# Patient Record
Sex: FEMALE | Race: WHITE
[De-identification: names, ages, dates, MRNs, and addresses within clinical notes are randomized per-mention and may not be internally consistent; named-entity substitution may affect disease eponyms.]

---

## 2018-10-16 ENCOUNTER — HOSPITAL ENCOUNTER (EMERGENCY)
Dept: HOSPITAL 80 - FED | Age: 28
Discharge: HOME | End: 2018-10-16
Payer: MEDICAID

## 2018-10-16 VITALS — SYSTOLIC BLOOD PRESSURE: 123 MMHG | DIASTOLIC BLOOD PRESSURE: 76 MMHG

## 2018-10-16 DIAGNOSIS — Z90.49: ICD-10-CM

## 2018-10-16 DIAGNOSIS — E86.9: ICD-10-CM

## 2018-10-16 DIAGNOSIS — G43.809: Primary | ICD-10-CM

## 2018-10-16 LAB — PLATELET # BLD: 245 10^3/UL (ref 150–400)

## 2018-10-16 NOTE — EDPHY
H & P


Stated Complaint: migraine headache since 4 p.m. yeaterday, increased pain @ 1 

am


Time Seen by Provider: 10/16/18 04:26


HPI/ROS: 





HPI





CHIEF COMPLAINT:  "I am having a migraine"





HISTORY OF PRESENT ILLNESS:  28-year-old female, suffers from migraine headaches

, presents emergency room stating that she is having a migraine.  She typically 

would take migraine medication however she does not have a primary care doctor 

and does not have any current migraine medications.  She states her headache is 

bifrontal and is very similar to her previous migraine headaches.  Describes a 

throbbing sensation.  She also gets ocular migraines.  She denies any ocular 

complaints at this time.  Denies chest pain shortness of breath.  Denies fever.

  Denies neck stiffness.  Headaches been going on since 4:00 p.m. Yesterday.  

Gradual onset.  Not thunderclap.  Not sudden onset.





Past Medical History:  Migraine headache





Past Surgical History:  Gallbladder removal





Social History:  Denies daily use drugs alcohol tobacco.





Family History:  Noncontributory








ROS   


REVIEW OF SYSTEMS:


10 Systems were reviewed and negative with the exception of the elements 

mentioned in the history of present illness.








Exam   


Constitutional  nontoxic no acute distress triage nursing summary reviewed, 

vital signs reviewed, awake/alert. 


Eyes   normal conjunctivae and sclera, EOMI, PERRLA. 


HENT   normal inspection, atraumatic, moist mucus membranes, no epistaxis, neck 

supple/ no meningismus, no raccoon eyes. 


Respiratory   clear to auscultation bilaterally, normal breath sounds, no 

respiratory distress, no wheezing. 


Cardiovascular   rate normal, regular rhythm, no murmur, no edema, distal 

pulses normal. 


Gastrointestinal   soft, non-tender, no rebound, no guarding, normal bowel 

sounds, no distension, no pulsatile mass. 


Genitourinary   no CVA tenderness. 


Musculoskeletal  no midline vertebral tenderness, full range of motion, no calf 

swelling, no tenderness of extremities, no meningismus, good pulses, 

neurovascularly intact.


Skin   pink, warm, & dry, no rash, skin atraumatic. 


Neurologic normal neurological exam  awake, alert and oriented x 3, AAOx3, 

moves all 4 extremities equally, motor intact, sensory intact, CN II-XII intact

, normal cerebellar, normal vision, normal speech. 


Psychiatric   normal mood/affect. 


Heme/Lymph/Immune   no lymphadenopathy.





Differential Diagnosis:  Includes but is not limited to in a particular order 

migraine headache, tension headache, cluster headache, intracranial bleed, 

meningitis





Medical Decision Making:  Plan for this patient her neurological exam here is 

unremarkable.  She describes her headache is similar to her previous migraine 

headaches.  She did take ibuprofen prior to arrival and earlier in the day 

without much relief.  Her headache she reports is very similar to previous 

headaches.





Re-evaluation:


Plan for patient migraine cocktail IV, basic blood work, IV fluids and 

monitoring.  Re-evaluation.





0607:  Patient re-evaluated this time resting comfortably neurological exam is 

unremarkable.  Patient feels much better.  Headache is resolved.  Recommend 

follow up with primary care doctor.  Fioricet prescription provided.  Return 

precautions discussed.


Source: Patient





- Personal History


Current Tetanus/Diphtheria Vaccine: Unsure


Current Tetanus Diphtheria and Acellular Pertussis (TDAP): Unsure





- Medical/Surgical History


Hx Asthma: Yes


Hx Chronic Respiratory Disease: No


Hx Diabetes: No


Hx Cardiac Disease: No


Hx Renal Disease: No


Hx Cirrhosis: No


Hx Alcoholism: No


Hx HIV/AIDS: No


Hx Splenectomy or Spleen Trauma: No


Other PMH: migraines, depression, choli, DDD,.  asthma as a child





- Social History


Smoking Status: Never smoked


Constitutional: 


 Initial Vital Signs











Temperature (C)  36.6 C   10/16/18 04:15


 


Heart Rate  90   10/16/18 04:15


 


Respiratory Rate  18   10/16/18 04:15


 


Blood Pressure  142/82 H  10/16/18 04:15


 


O2 Sat (%)  99   10/16/18 04:15








 











O2 Delivery Mode               Room Air














Allergies/Adverse Reactions: 


 





No Known Allergies Allergy (Unverified 10/16/18 06:04)


 








Home Medications: 














 Medication  Instructions  Recorded


 


Acet/Caffeine/Buta Fioricet 1 each PO Q6 #20 tab 10/16/18





[Fioricet]  














Medical Decision Making





- Data Points


Laboratory Results: 


 Laboratory Results





 10/16/18 04:45 





 10/16/18 04:45 





 











  10/16/18 10/16/18





  04:45 04:45


 


WBC    11.57 10^3/uL H 10^3/uL





    (3.80-9.50) 


 


RBC    4.11 10^6/uL L 10^6/uL





    (4.18-5.33) 


 


Hgb    12.3 g/dL L g/dL





    (12.6-16.3) 


 


Hct    36.4 % L %





    (38.0-47.0) 


 


MCV    88.6 fL fL





    (81.5-99.8) 


 


MCH    29.9 pg pg





    (27.9-34.1) 


 


MCHC    33.8 g/dL g/dL





    (32.4-36.7) 


 


RDW    12.7 % %





    (11.5-15.2) 


 


Plt Count    245 10^3/uL 10^3/uL





    (150-400) 


 


MPV    10.6 fL fL





    (8.7-11.7) 


 


Neut % (Auto)    56.5 % %





    (39.3-74.2) 


 


Lymph % (Auto)    36.0 % %





    (15.0-45.0) 


 


Mono % (Auto)    5.7 % %





    (4.5-13.0) 


 


Eos % (Auto)    1.0 % %





    (0.6-7.6) 


 


Baso % (Auto)    0.6 % %





    (0.3-1.7) 


 


Nucleat RBC Rel Count    0.0 % %





    (0.0-0.2) 


 


Absolute Neuts (auto)    6.55 10^3/uL H 10^3/uL





    (1.70-6.50) 


 


Absolute Lymphs (auto)    4.16 10^3/uL H 10^3/uL





    (1.00-3.00) 


 


Absolute Monos (auto)    0.66 10^3/uL 10^3/uL





    (0.30-0.80) 


 


Absolute Eos (auto)    0.11 10^3/uL 10^3/uL





    (0.03-0.40) 


 


Absolute Basos (auto)    0.07 10^3/uL 10^3/uL





    (0.02-0.10) 


 


Absolute Nucleated RBC    0.00 10^3/uL 10^3/uL





    (0-0.01) 


 


Immature Gran %    0.2 % %





    (0.0-1.1) 


 


Immature Gran #    0.02 10^3/uL 10^3/uL





    (0.00-0.10) 


 


Sodium  139 mEq/L mEq/L  





   (135-145)  


 


Potassium  4.2 mEq/L mEq/L  





   (3.3-5.0)  


 


Chloride  107 mEq/L mEq/L  





   ()  


 


Carbon Dioxide  22 mEq/l mEq/l  





   (22-31)  


 


Anion Gap  10 mEq/L mEq/L  





   (6-14)  


 


BUN  16 mg/dL mg/dL  





   (7-23)  


 


Creatinine  0.7 mg/dL mg/dL  





   (0.6-1.0)  


 


Estimated GFR  > 60   





   


 


Glucose  83 mg/dL mg/dL  





   ()  


 


Calcium  9.3 mg/dL mg/dL  





   (8.5-10.4)  











Medications Given: 


 








Discontinued Medications





Dexamethasone (Decadron Injection)  10 mg IVP EDNOW ONE


   Stop: 10/16/18 05:01


   Last Admin: 10/16/18 04:49 Dose:  10 mg


Diphenhydramine HCl (Benadryl Injection)  25 mg IVP EDNOW ONE


   Stop: 10/16/18 04:27


   Last Admin: 10/16/18 04:43 Dose:  25 mg


Sodium Chloride (Ns)  1,000 mls @ 0 mls/hr IV ONCE ONE; Wide Open


   PRN Reason: Protocol


   Stop: 10/16/18 04:27


   Last Admin: 10/16/18 04:45 Dose:  1,000 mls


Ketorolac Tromethamine (Toradol)  30 mg IVP EDNOW ONE


   Stop: 10/16/18 04:27


   Last Admin: 10/16/18 04:42 Dose:  30 mg


Metoclopramide HCl (Reglan Injection)  10 mg IVP EDNOW ONE


   Stop: 10/16/18 04:27


   Last Admin: 10/16/18 04:44 Dose:  10 mg








Departure





- Departure


Disposition: Home, Routine, Self-Care


Clinical Impression: 


Migraine headache


Qualifiers:


 Migraine type: other Status migrainosus presence: without status migrainosus 

Intractability: not intractable Qualified Code(s): G43.809 - Other migraine, 

not intractable, without status migrainosus





Condition: Good


Instructions:  Migraine Headache (ED)


Additional Instructions: 


1. Rest.


2. Stay well-hydrated.


3. Return to the emergency room if worsening symptoms


Referrals: 


[facility] SANDERS [Other] - As per Instructions


Prescriptions: 


Acet/Caffeine/Buta Fioricet [Fioricet] 1 each PO Q6 #20 tab